# Patient Record
Sex: FEMALE | Race: ASIAN | NOT HISPANIC OR LATINO | URBAN - METROPOLITAN AREA
[De-identification: names, ages, dates, MRNs, and addresses within clinical notes are randomized per-mention and may not be internally consistent; named-entity substitution may affect disease eponyms.]

---

## 2022-11-15 ENCOUNTER — EMERGENCY (EMERGENCY)
Facility: HOSPITAL | Age: 57
LOS: 0 days | Discharge: HOME | End: 2022-11-15
Attending: EMERGENCY MEDICINE | Admitting: EMERGENCY MEDICINE

## 2022-11-15 VITALS
OXYGEN SATURATION: 99 % | TEMPERATURE: 96 F | SYSTOLIC BLOOD PRESSURE: 145 MMHG | HEART RATE: 78 BPM | DIASTOLIC BLOOD PRESSURE: 82 MMHG | HEIGHT: 64 IN | WEIGHT: 117.95 LBS | RESPIRATION RATE: 20 BRPM
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
## 2022-11-15 DIAGNOSIS — Z23 ENCOUNTER FOR IMMUNIZATION: ICD-10-CM

## 2022-11-15 DIAGNOSIS — S61.210A LACERATION WITHOUT FOREIGN BODY OF RIGHT INDEX FINGER WITHOUT DAMAGE TO NAIL, INITIAL ENCOUNTER: ICD-10-CM

## 2022-11-15 PROCEDURE — 99283 EMERGENCY DEPT VISIT LOW MDM: CPT | Mod: 25

## 2022-11-15 PROCEDURE — 12001 RPR S/N/AX/GEN/TRNK 2.5CM/<: CPT

## 2022-11-15 RX ORDER — TETANUS TOXOID, REDUCED DIPHTHERIA TOXOID AND ACELLULAR PERTUSSIS VACCINE, ADSORBED 5; 2.5; 8; 8; 2.5 [IU]/.5ML; [IU]/.5ML; UG/.5ML; UG/.5ML; UG/.5ML
0.5 SUSPENSION INTRAMUSCULAR ONCE
Refills: 0 | Status: COMPLETED | OUTPATIENT
Start: 2022-11-15 | End: 2022-11-15

## 2022-11-15 RX ADMIN — TETANUS TOXOID, REDUCED DIPHTHERIA TOXOID AND ACELLULAR PERTUSSIS VACCINE, ADSORBED 0.5 MILLILITER(S): 5; 2.5; 8; 8; 2.5 SUSPENSION INTRAMUSCULAR at 12:30

## 2022-11-15 NOTE — ED PROVIDER NOTE - PHYSICAL EXAMINATION
Vital Signs: I have reviewed the initial vital signs.  Constitutional: well-nourished, no acute distress  Musculoskeletal: good ROM of extremity,  no bony tenderness, no deformity, good peripheral pulses  Integumentary: (+)1 cm superficial skin avulsion prox extensor surface right 2nd digit, with bleeding. no tendon injury. good cap refill  Neurologic: awake, alert, extremities’ motor and sensory functions grossly intact, no focal deficits  heme: (-) no adenopathy (-)lymphangitis

## 2022-11-15 NOTE — ED PROVIDER NOTE - NS ED ATTENDING STATEMENT MOD
This was a shared visit with the ÁLVARO. I reviewed and verified the documentation and independently performed the documented:

## 2022-11-15 NOTE — ED PROVIDER NOTE - ATTENDING APP SHARED VISIT CONTRIBUTION OF CARE
56-year-old old female presents with family member for evaluation of laceration to right pointer finger sustained this morning evaluation of broken glass in her second.  Patient needs tetanus.  On exam patient in NAD, AAOx3, positive avulsion of skin to right pointer finger, positive bleeding, good range of motion, brisk cap refill

## 2022-11-15 NOTE — ED PROVIDER NOTE - OBJECTIVE STATEMENT
55 y/o presents to the Ed with skin avulsion to right 2nd digit extensor surface on broken glass in the sink this am. patient with multiple abrasions to forearm. patient without any tingling to digits. patient with bleeding from wound.

## 2022-11-15 NOTE — ED ADULT NURSE NOTE - HISTORY OF COVID-19 VACCINATION
Pt resides in pvt home with .  PTA pt was functionally IND and used a straight cane for stair negotiation if no railing. Vaccine status unknown

## 2022-11-15 NOTE — ED PROVIDER NOTE - CLINICAL SUMMARY MEDICAL DECISION MAKING FREE TEXT BOX
Tetanus updated.  Wound dressed with quick clot.  Hemostasis achieved.  Patient instructed on wound care.  Patient will return if any worsening symptoms or concerns

## 2022-11-15 NOTE — ED PROVIDER NOTE - NSFOLLOWUPINSTRUCTIONS_ED_ALL_ED_FT
Laceration    A laceration is a cut that goes through all of the layers of the skin and into the tissue that is right under the skin. Some lacerations heal on their own. Others need to be closed with stitches (sutures), staples, skin adhesive strips, or skin glue. Proper laceration care minimizes the risk of infection and helps the laceration to heal better.     SEEK IMMEDIATE MEDICAL CARE IF YOU HAVE THE FOLLOWING SYMPTOMS: swelling around the wound, worsening pain, drainage from the wound, red streaking going away from your wound, inability to move finger or toe near the laceration, or discoloration of skin near the laceration.    Skin Avulsion    WHAT YOU NEED TO KNOW:    Skin avulsion is a wound that happens when skin is torn from your body during an accident or other injury. The torn skin may be lost or too damaged to be repaired, and it must be removed. A wound of this type cannot be stitched closed because there is tissue missing. Avulsion wounds are usually bigger and have more scars because of the missing tissue.    DISCHARGE INSTRUCTIONS:    Medicines:     Antibiotic ointment: Your healthcare provider may tell you to gently rub a topical antibiotic ointment on your wound. This will help prevent an infection and help your wound heal faster.       Pain medicine: You may be given medicine to take away or decrease pain. Do not wait until the pain is severe before you take your medicine.      NSAIDs, such as ibuprofen, help decrease swelling, pain, and fever. This medicine is available with or without a doctor's order. NSAIDs can cause stomach bleeding or kidney problems in certain people. If you take blood thinner medicine, always ask if NSAIDs are safe for you. Always read the medicine label and follow directions. Do not give these medicines to children under 6 months of age without direction from your child's healthcare provider.      Take your medicine as directed. Contact your healthcare provider if you think your medicine is not helping or if you have side effects. Tell him of her if you are allergic to any medicine. Keep a list of the medicines, vitamins, and herbs you take. Include the amounts, and when and why you take them. Bring the list or the pill bottles to follow-up visits. Carry your medicine list with you in case of an emergency.    Care for your wound: Avulsion wounds may take longer to heal because they cannot be closed with tape or stitches. Keep your wound clean and protected to prevent infection and speed healing.     Clean your wound: Wash your hands with soap and water before and after you care for your wound. You may be able to use a soft cloth to gently clean the wound after the first 24 to 48 hours. After that, gently clean the wound once or twice a day with cool water. Do not soak your wound. Use soap to clean around the wound, but try not to get any on the wound itself. Do not use alcohol or hydrogen peroxide to clean your wound unless you are directed to. Gently pat the area dry and reapply the bandage as directed.       Elevate your wound: Prop your injured area on pillows to raise it above the level of your heart. This will help reduce pain and swelling. Do this for 30 minutes at a time, as often as you can.       Bandage your wound: Bandages keep your wound clean, dry, and protected from infection. They may also prevent swelling. Use a bandage that does not stick to your wound, and has a spongy layer to absorb fluids. Leave your bandage on as long as directed. Ask your healthcare provider when and how to change your bandage. Do not wrap the bandage too tightly. This could cut off blood flow and cause more injury.       Use cool compresses: Wet a washcloth or towel with cool water and hold it on your wound as directed. Ask how often to apply the compress and for how long each time.      Reduce scarring: Avoid direct sunlight on your wound. Sunlight may burn or change the color of the new skin over your wound. Use sunscreen (SPF 30 or higher) on the new skin for at least 1 year after it heals.    Support for leg and arm wounds: You may need to use crutches if the wound is on your leg. You may need to use a sling if the wound is on your arm. Crutches and slings help protect the injured area, prevent further injury, and heal the area in the right position.     Follow up with your healthcare provider within 2 days or as directed: If you have stitches, ask when to return to have them removed. Write down your questions so you remember to ask them during your visits.     Contact your healthcare provider if:     You have new pain, or it gets worse.       You have trouble moving the injured body area.       Your wound splits open or does not seem to be healing.    Return to the emergency department if:     You have a fever.       You have painful swelling, redness, or warmth around your wound.      Your wound is red and there are red streaks on your skin starting at your wound and moving upward.       Your wound is draining pus.       You have heavy bleeding or bleeding that does not stop after 10 minutes of holding firm, direct pressure over the wound.      You feel like there is an object stuck in your wound.          © Copyright [employer] 2019 All illustrations and images included in CareNotes are the copyrighted property of [employer] or [employer].

## 2022-11-15 NOTE — ED PROVIDER NOTE - PATIENT PORTAL LINK FT
You can access the FollowMyHealth Patient Portal offered by [facility] by registering at the following website: [email or web address]. By joining [employer]’s FollowMyHealth portal, you will also be able to view your health information using other applications (apps) compatible with our system.

## 2022-11-18 ENCOUNTER — EMERGENCY (EMERGENCY)
Facility: HOSPITAL | Age: 57
LOS: 0 days | Discharge: HOME | End: 2022-11-18
Attending: EMERGENCY MEDICINE | Admitting: EMERGENCY MEDICINE

## 2022-11-18 VITALS
HEART RATE: 84 BPM | DIASTOLIC BLOOD PRESSURE: 78 MMHG | WEIGHT: 117.95 LBS | SYSTOLIC BLOOD PRESSURE: 138 MMHG | TEMPERATURE: 97 F | HEIGHT: 64 IN | RESPIRATION RATE: 20 BRPM
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
## 2022-11-18 DIAGNOSIS — S61.210D LACERATION WITHOUT FOREIGN BODY OF RIGHT INDEX FINGER WITHOUT DAMAGE TO NAIL, SUBSEQUENT ENCOUNTER: ICD-10-CM

## 2022-11-18 PROBLEM — Z78.9 OTHER SPECIFIED HEALTH STATUS: Chronic | Status: ACTIVE | Noted: 2022-11-15

## 2022-11-18 PROCEDURE — 99283 EMERGENCY DEPT VISIT LOW MDM: CPT

## 2022-11-18 NOTE — ED PROVIDER NOTE - CARE PROVIDER_API CALL
Guicho Carlton (DO)  Plastic Surgery  [address] [place] [address]  Phone: [phone]  Fax: [phone]  Follow Up Time:

## 2022-11-18 NOTE — ED PROVIDER NOTE - OBJECTIVE STATEMENT
Patient is a 56-year-old female no medical history here for evaluation of skin avulsion to base of right second digit 4 days ago.  Patient was evaluated in the ED at that time and had wound dressed.  Patient saw her primary doctor today who requested patient come to ED for wound check.  Patient denies fever, chills, redness

## 2022-11-18 NOTE — ED PROVIDER NOTE - ATTENDING APP SHARED VISIT CONTRIBUTION OF CARE
51-year-old female seen in the emergency department on November 15 for right second digit skin status post cutting it on broken glass in the same 56-year-old female seen in the emergency department on November 15 for right second digit avulsed skin status post cutting it on broken glass in sink presents today for wound check.  Patient reports she noted some discharge and mild erythema surrounding the involved skin, went to see her primary care doctor presented to the emergency department for further evaluation.  Patient reports no discomfort or pain to the area.  Has been applying Neosporin.. No fever, chills, nausea, vomiting, numbness/tingling, decreased sensation, ecchymoses, swelling, hearing a click/feeling a pop, or any trauma. received tdap on last visit,    on exam: WDWN non toxic appearing pt. (+) R second digit on extensor surface avulsed skin with healing skin overlying, no active draining, mild surrounding erythema, no streaking.  No swelling or ecchymoses. No crepitus. Radial pulses 2/4 b/l. Cap refill < 2 seconds, No obvious bony deformity. Good finger opposition, FROM of R wrist in flexion, extension, ulna and radial deviation. No snuff box tenderness.    FROM of R second index finger in flexion, extension at PIP, DIP and MCP, no pain to palpation to finger pad, no pain to palpation along tendon sheath, finger not held in flexion, no fusiform swelling, no pain with passive extension , no weakness,  FROM of R elbow in flexion, extensions, supination and pronation, and LRshoulder in flexion, extension, abduction, and adduction with no pain to palpation. Motor 5/5 and sensation intact over the upper extremity including R second digit.     Plan: Extensive conversation had with patient regarding proper wound care, antibiotics, signs and symptoms to return for, follow-up with PMD and plastics

## 2022-11-18 NOTE — ED PROVIDER NOTE - PHYSICAL EXAMINATION
VITAL SIGNS: I have reviewed nursing notes and confirm.  CONSTITUTIONAL: Well-developed; well-nourished; in no acute distress.   SKIN:  see ext :Remainder of skin exam is warm and dry, no acute rash.    HEAD: Normocephalic; atraumatic.  EYES:  conjunctiva and sclera clear.  ENT: No nasal discharge; airway clear.  EXT: Avulsion to base of right second digit overlying dorsal aspect no surrounding erythema warmth or streaking proximally.  Full range of motion of the affected digit Normal ROM.  No clubbing, cyanosis or edema.   NEURO: Alert, oriented, grossly unremarkable

## 2022-11-18 NOTE — ED PROVIDER NOTE - NS ED ROS FT
MS:  No myalgia, muscle weakness, joint pain or back pain.  Neuro:  No headache or weakness.  No LOC.  Skin:  + skin avulsion   Endocrine: No history of thyroid disease or diabetes.  Except as documented in the HPI,  all other systems are negative.

## 2022-11-18 NOTE — ED PROVIDER NOTE - PATIENT PORTAL LINK FT
You can access the FollowMyHealth Patient Portal offered by [facility] by registering at the following website: [email or web address]. By joining [employer]’s FollowMyHealth portal, you will also be able to view your health information using other applications (apps) compatible with our system.

## 2022-11-18 NOTE — ED PROVIDER NOTE - NSFOLLOWUPINSTRUCTIONS_ED_ALL_ED_FT
Laceration    A laceration is a cut that goes through all of the layers of the skin and into the tissue that is right under the skin. Some lacerations heal on their own. Others need to be closed with skin adhesive strips, skin glue, stitches (sutures), or staples. Proper laceration care minimizes the risk of infection and helps the laceration to heal better.  If non-absorbable stitches or staples have been placed, they must be taken out within the time frame instructed by your healthcare provider.    SEEK IMMEDIATE MEDICAL CARE IF YOU HAVE ANY OF THE FOLLOWING SYMPTOMS: swelling around the wound, worsening pain, drainage from the wound, red streaking going away from your wound, inability to move finger or toe near the laceration, or discoloration of skin near the laceration.   Skin Tear Care    A skin tear is a wound in which the top layer of skin has peeled off. This is a common problem with aging because the skin becomes thinner and more fragile as a person gets older. In addition, some medicines, such as oral corticosteroids, can lead to skin thinning if taken for long periods of time.     A skin tear is often repaired with tape or skin adhesive strips. This keeps the skin that has been peeled off in contact with the healthier skin beneath. Depending on the location of the wound, a bandage (dressing) may be applied over the tape or skin adhesive strips. Sometimes, during the healing process, the skin turns black and dies. Even when this happens, the torn skin acts as a good dressing until the skin underneath gets healthier and repairs itself.    HOME CARE INSTRUCTIONS  Change dressings once per day or as directed by your caregiver.   Gently clean the skin tear and the area around the tear using saline solution or mild soap and water.   Do not rub the injured skin dry. Let the area air dry.   Apply petroleum jelly or an antibiotic cream or ointment to keep the tear moist. This will help the wound heal. Do not allow a scab to form.   If the dressing sticks before the next dressing change, moisten it with warm soapy water and gently remove it.   Protect the injured skin until it has healed.   Only take over-the-counter or prescription medicines as directed by your caregiver.   Take showers or baths using warm soapy water. Apply a new dressing after the shower or bath.  Keep all follow-up appointments as directed by your caregiver.      SEEK IMMEDIATE MEDICAL CARE IF:  You have redness, swelling, or increasing pain in the skin tear.   You have pus coming from the skin tear.   You have chills.  You have a red streak that goes away from the skin tear.  You have a bad smell coming from the tear or dressing.   You have a fever or persistent symptoms for more than 2–3 days.   You have a fever and your symptoms suddenly get worse.     MAKE SURE YOU:  Understand these instructions.   Will watch this condition.  Will get help right away if your child is not doing well or gets worse.    ADDITIONAL NOTES AND INSTRUCTIONS    Please follow up with your Primary MD in 24-48 hr.  Seek immediate medical care for any new/worsening signs or symptoms.

## 2022-11-18 NOTE — ED PROVIDER NOTE - CLINICAL SUMMARY MEDICAL DECISION MAKING FREE TEXT BOX
56-year-old female seen in the emergency department on November 15 for right second digit avulsed skin status post cutting it on broken glass in sink presents today for wound check.  Patient n/v intact with Full ROM and full motor strength with no signs of any serious injury. No signs of tendon injury on direct examination. Tetanus up-to-date.  Wound care discussed in detail. Signs of infection discussed. Medications administered and prescribed/OTC home meds discussed.  All questions and concerns from patient or family addressed. Understanding of instructions verbalized.  Will follow up as discussed.

## 2022-11-18 NOTE — ED PROVIDER NOTE - PROGRESS NOTE DETAILS
ED Attending NANO Norman  Extensive conversation had with patient regarding proper wound care, antibiotics, signs and symptoms to return for, follow-up with PMD and plastics

## 2022-12-09 NOTE — ED ADULT NURSE NOTE - NS_ED_NURSE_TEACHING_TOPIC_ED_A_ED
[id number]     Margo Forte MRN: [id number]  AGE: 69 year old  ADMIT DATE: 12/6/2022    CODE STATUS: Full Resuscitation  ISOLATION STATUS: No active isolations   DIET: Consistent Carb Moderate (45-75 Gm/meal) Diet  3 Times/day W Meals; Ensure High Protein/high Protein Low Carbohydrate Supplement, Vanilla Oral Nutrition Supplement  2 Times/day W Lunch & Dinner; Sherwin/tissue Building, Orange + Water Oral Nutrition Supplement    ALLERGIES:  Patient has no known allergies.     DX:Failed flap  (primary encounter diagnosis)  PAD (peripheral artery disease) (CMS/[facility])  Type 2 diabetes mellitus with hyperglycemia, without long-term current use of insulin (CMS/[facility])  Foot pain, right  Ischemic cardiomyopathy  Chronic systolic heart failure (CMS/[facility])  Necrosis of toe (CMS/[facility])     Att: Max Hurst MD  PCP: Yoni Hall MD  IP Consult Orders (From admission, onward)             Start     Ordered    12/08/22 1405  Inpatient consult to Pain Management  ONE TIME        Comments: Dr Hurst would like you help in managing this patients pain especially after he is discharged.   Provider:  Morales Ibrahim MD    12/08/22 1404    12/08/22 1245  Inpatient Consult to Interventional Radiology  ONE TIME        Provider:  Katy Connolly CNP    12/08/22 1245    12/07/22 1250  Inpatient consult to Wound Care Medical Provider  (IP Consult to Wound Care Medical Provider Panel)  ONE TIME        Provider:  Julio Mcgovern MD    12/07/22 1251    12/06/22 1913  Inpatient consult to Podiatry  ONE TIME        Provider:  Lenny Brennan DPM    12/06/22 1912 12/06/22 1912  Inpatient consult to Cardiology  ONE TIME        Provider:  Eliezer Villegas MD    12/06/22 1912                    BP: 137/66  Temp: 98.1 °F (36.7 °C)  Temp src: Oral  Heart Rate: (!) 59  Resp: 18  SpO2: 98 %  Height: 5' 1.81\" (157 cm)  Weight: 57.8 kg (127 lb 6.8 oz)   Weight change:      Recent Labs   Lab 12/08/22  0749 12/08/22  1115 12/08/22  1729  12/08/22  2144   GLUCOSE BEDSIDE 184* 209* 101* 126*        Creatinine (mg/dL)   Date Value   12/06/2022 0.98   11/24/2022 0.71     PTT (sec)   Date Value   12/06/2022 25     INR (no units)   Date Value   12/06/2022 1.0     WBC (K/mcL)   Date Value   12/06/2022 6.9   11/26/2022 4.4        I/O last 3 completed shifts:  In: -   Out: 580 [Urine:580]                         IMPORTANT EVENTS THIS SHIFT:    Pain managed with percocet q 4 hrs prn. Nitroglycerine patch removed, foot dressing changed with iodine gauze   Pt comfortable, all needs met. Call light within reach. Med script for percocet by Dr. Ibrahim in pts chart. RN to call pharmacy (pts choice) to confirm meds are in stock and available for immediate pu prior to discharging patient. Send info to  for pts request to have note sent or available for pts daughter who lives in [place] permission to travel here to care for pt.    IMPORTANT EVENTS COMING UP/GOALS (PLEASE INCLUDE WHITE BOARD AND DISCHARGE BOARD UPDATES):     consult--Pt needs a care taker, how can daughter in [place] come and help    Call pharmacy to make sure percocet in stock before pt is going to get prescription filled   PATIENT SPECIAL NEEDS/ACCOMMODATIONS:    Tele  ACHS  Surgical boot  Andorran speaking                              Wound care

## 2023-06-29 ENCOUNTER — EMERGENCY (EMERGENCY)
Facility: HOSPITAL | Age: 58
LOS: 0 days | Discharge: HOME | End: 2023-06-29
Attending: EMERGENCY MEDICINE
Payer: MEDICAID

## 2023-06-29 VITALS
HEIGHT: 64 IN | DIASTOLIC BLOOD PRESSURE: 59 MMHG | RESPIRATION RATE: 18 BRPM | HEART RATE: 68 BPM | SYSTOLIC BLOOD PRESSURE: 123 MMHG | OXYGEN SATURATION: 99 % | WEIGHT: 110.01 LBS

## 2023-06-29 VITALS
SYSTOLIC BLOOD PRESSURE: 119 MMHG | RESPIRATION RATE: 18 BRPM | HEART RATE: 65 BPM | DIASTOLIC BLOOD PRESSURE: 64 MMHG | TEMPERATURE: 97 F | OXYGEN SATURATION: 99 %

## 2023-06-29 DIAGNOSIS — R00.1 BRADYCARDIA, UNSPECIFIED: ICD-10-CM

## 2023-06-29 DIAGNOSIS — R10.9 UNSPECIFIED ABDOMINAL PAIN: ICD-10-CM

## 2023-06-29 DIAGNOSIS — K52.9 NONINFECTIVE GASTROENTERITIS AND COLITIS, UNSPECIFIED: ICD-10-CM

## 2023-06-29 DIAGNOSIS — R11.0 NAUSEA: ICD-10-CM

## 2023-06-29 LAB
ALBUMIN SERPL ELPH-MCNC: 3.9 G/DL — SIGNIFICANT CHANGE UP (ref 3.5–5.2)
ALP SERPL-CCNC: 96 U/L — SIGNIFICANT CHANGE UP (ref 30–115)
ALT FLD-CCNC: 30 U/L — SIGNIFICANT CHANGE UP (ref 0–41)
ANION GAP SERPL CALC-SCNC: 15 MMOL/L — HIGH (ref 7–14)
APPEARANCE UR: CLEAR — SIGNIFICANT CHANGE UP
AST SERPL-CCNC: 32 U/L — SIGNIFICANT CHANGE UP (ref 0–41)
BASOPHILS # BLD AUTO: 0.03 K/UL — SIGNIFICANT CHANGE UP (ref 0–0.2)
BASOPHILS NFR BLD AUTO: 0.3 % — SIGNIFICANT CHANGE UP (ref 0–1)
BILIRUB DIRECT SERPL-MCNC: <0.2 MG/DL — SIGNIFICANT CHANGE UP (ref 0–0.3)
BILIRUB INDIRECT FLD-MCNC: < 0.2 MG/DL — SIGNIFICANT CHANGE UP (ref 0.2–1.2)
BILIRUB SERPL-MCNC: <0.2 MG/DL — SIGNIFICANT CHANGE UP (ref 0.2–1.2)
BILIRUB UR-MCNC: NEGATIVE — SIGNIFICANT CHANGE UP
BUN SERPL-MCNC: 25 MG/DL — HIGH (ref 10–20)
CALCIUM SERPL-MCNC: 8.6 MG/DL — SIGNIFICANT CHANGE UP (ref 8.4–10.5)
CHLORIDE SERPL-SCNC: 106 MMOL/L — SIGNIFICANT CHANGE UP (ref 98–110)
CO2 SERPL-SCNC: 19 MMOL/L — SIGNIFICANT CHANGE UP (ref 17–32)
COLOR SPEC: YELLOW — SIGNIFICANT CHANGE UP
CREAT SERPL-MCNC: 0.8 MG/DL — SIGNIFICANT CHANGE UP (ref 0.7–1.5)
DIFF PNL FLD: NEGATIVE — SIGNIFICANT CHANGE UP
EGFR: 86 ML/MIN/1.73M2 — SIGNIFICANT CHANGE UP
EOSINOPHIL # BLD AUTO: 0.08 K/UL — SIGNIFICANT CHANGE UP (ref 0–0.7)
EOSINOPHIL NFR BLD AUTO: 0.9 % — SIGNIFICANT CHANGE UP (ref 0–8)
GLUCOSE SERPL-MCNC: 156 MG/DL — HIGH (ref 70–99)
GLUCOSE UR QL: NEGATIVE MG/DL — SIGNIFICANT CHANGE UP
HCT VFR BLD CALC: 34 % — LOW (ref 37–47)
HGB BLD-MCNC: 11.1 G/DL — LOW (ref 12–16)
IMM GRANULOCYTES NFR BLD AUTO: 0.2 % — SIGNIFICANT CHANGE UP (ref 0.1–0.3)
KETONES UR-MCNC: NEGATIVE — SIGNIFICANT CHANGE UP
LACTATE SERPL-SCNC: 2.7 MMOL/L — HIGH (ref 0.7–2)
LEUKOCYTE ESTERASE UR-ACNC: NEGATIVE — SIGNIFICANT CHANGE UP
LIDOCAIN IGE QN: 44 U/L — SIGNIFICANT CHANGE UP (ref 7–60)
LYMPHOCYTES # BLD AUTO: 4.49 K/UL — HIGH (ref 1.2–3.4)
LYMPHOCYTES # BLD AUTO: 52.2 % — HIGH (ref 20.5–51.1)
MCHC RBC-ENTMCNC: 29 PG — SIGNIFICANT CHANGE UP (ref 27–31)
MCHC RBC-ENTMCNC: 32.6 G/DL — SIGNIFICANT CHANGE UP (ref 32–37)
MCV RBC AUTO: 88.8 FL — SIGNIFICANT CHANGE UP (ref 81–99)
MONOCYTES # BLD AUTO: 0.48 K/UL — SIGNIFICANT CHANGE UP (ref 0.1–0.6)
MONOCYTES NFR BLD AUTO: 5.6 % — SIGNIFICANT CHANGE UP (ref 1.7–9.3)
NEUTROPHILS # BLD AUTO: 3.5 K/UL — SIGNIFICANT CHANGE UP (ref 1.4–6.5)
NEUTROPHILS NFR BLD AUTO: 40.8 % — LOW (ref 42.2–75.2)
NITRITE UR-MCNC: NEGATIVE — SIGNIFICANT CHANGE UP
NRBC # BLD: 0 /100 WBCS — SIGNIFICANT CHANGE UP (ref 0–0)
PH UR: 7 — SIGNIFICANT CHANGE UP (ref 5–8)
PLATELET # BLD AUTO: 254 K/UL — SIGNIFICANT CHANGE UP (ref 130–400)
PMV BLD: 8.5 FL — SIGNIFICANT CHANGE UP (ref 7.4–10.4)
POTASSIUM SERPL-MCNC: 3.9 MMOL/L — SIGNIFICANT CHANGE UP (ref 3.5–5)
POTASSIUM SERPL-SCNC: 3.9 MMOL/L — SIGNIFICANT CHANGE UP (ref 3.5–5)
PROT SERPL-MCNC: 5.8 G/DL — LOW (ref 6–8)
PROT UR-MCNC: NEGATIVE MG/DL — SIGNIFICANT CHANGE UP
RBC # BLD: 3.83 M/UL — LOW (ref 4.2–5.4)
RBC # FLD: 13.1 % — SIGNIFICANT CHANGE UP (ref 11.5–14.5)
SODIUM SERPL-SCNC: 140 MMOL/L — SIGNIFICANT CHANGE UP (ref 135–146)
SP GR SPEC: 1.01 — SIGNIFICANT CHANGE UP (ref 1.01–1.03)
TROPONIN T SERPL-MCNC: <0.01 NG/ML — SIGNIFICANT CHANGE UP
UROBILINOGEN FLD QL: 0.2 MG/DL — SIGNIFICANT CHANGE UP
WBC # BLD: 8.6 K/UL — SIGNIFICANT CHANGE UP (ref 4.8–10.8)
WBC # FLD AUTO: 8.6 K/UL — SIGNIFICANT CHANGE UP (ref 4.8–10.8)

## 2023-06-29 PROCEDURE — 36415 COLL VENOUS BLD VENIPUNCTURE: CPT

## 2023-06-29 PROCEDURE — 84484 ASSAY OF TROPONIN QUANT: CPT

## 2023-06-29 PROCEDURE — 99285 EMERGENCY DEPT VISIT HI MDM: CPT

## 2023-06-29 PROCEDURE — 99285 EMERGENCY DEPT VISIT HI MDM: CPT | Mod: 25

## 2023-06-29 PROCEDURE — 96374 THER/PROPH/DIAG INJ IV PUSH: CPT

## 2023-06-29 PROCEDURE — 80048 BASIC METABOLIC PNL TOTAL CA: CPT

## 2023-06-29 PROCEDURE — 80076 HEPATIC FUNCTION PANEL: CPT

## 2023-06-29 PROCEDURE — 82962 GLUCOSE BLOOD TEST: CPT

## 2023-06-29 PROCEDURE — 81003 URINALYSIS AUTO W/O SCOPE: CPT

## 2023-06-29 PROCEDURE — 74177 CT ABD & PELVIS W/CONTRAST: CPT | Mod: 26,MA

## 2023-06-29 PROCEDURE — 74177 CT ABD & PELVIS W/CONTRAST: CPT | Mod: MA

## 2023-06-29 PROCEDURE — 85025 COMPLETE CBC W/AUTO DIFF WBC: CPT

## 2023-06-29 PROCEDURE — 83605 ASSAY OF LACTIC ACID: CPT

## 2023-06-29 PROCEDURE — 83690 ASSAY OF LIPASE: CPT

## 2023-06-29 PROCEDURE — 96375 TX/PRO/DX INJ NEW DRUG ADDON: CPT

## 2023-06-29 PROCEDURE — 93010 ELECTROCARDIOGRAM REPORT: CPT | Mod: 76

## 2023-06-29 PROCEDURE — 93005 ELECTROCARDIOGRAM TRACING: CPT

## 2023-06-29 RX ORDER — FAMOTIDINE 10 MG/ML
20 INJECTION INTRAVENOUS ONCE
Refills: 0 | Status: COMPLETED | OUTPATIENT
Start: 2023-06-29 | End: 2023-06-29

## 2023-06-29 RX ORDER — SODIUM CHLORIDE 9 MG/ML
1000 INJECTION INTRAMUSCULAR; INTRAVENOUS; SUBCUTANEOUS ONCE
Refills: 0 | Status: COMPLETED | OUTPATIENT
Start: 2023-06-29 | End: 2023-06-29

## 2023-06-29 RX ORDER — ONDANSETRON 8 MG/1
4 TABLET, FILM COATED ORAL ONCE
Refills: 0 | Status: COMPLETED | OUTPATIENT
Start: 2023-06-29 | End: 2023-06-29

## 2023-06-29 RX ADMIN — ONDANSETRON 4 MILLIGRAM(S): 8 TABLET, FILM COATED ORAL at 03:17

## 2023-06-29 RX ADMIN — FAMOTIDINE 20 MILLIGRAM(S): 10 INJECTION INTRAVENOUS at 03:16

## 2023-06-29 RX ADMIN — SODIUM CHLORIDE 1000 MILLILITER(S): 9 INJECTION INTRAMUSCULAR; INTRAVENOUS; SUBCUTANEOUS at 03:16

## 2023-06-29 NOTE — ED PROVIDER NOTE - CARE PROVIDER_API CALL
Otis Blackwood E  Pulmonary Disease  [address] [place] [id number]  Phone: [phone]  Fax: [phone]  Follow Up Time:

## 2023-06-29 NOTE — ED ADULT TRIAGE NOTE - CHIEF COMPLAINT QUOTE
Pt BIBA from home for N/V and 1 episode syncope; after eating crabs at 1800; BP on scene 60/30; received 1L fluids; /60; NKA; no HX.

## 2023-06-29 NOTE — ED PROVIDER NOTE - OBJECTIVE STATEMENT
57 year old female no sig past medical history comes to emergency room for abdominal pain and nausea. patient states that she had crabs for dinner and woke up with 2am very nausea and having stomach cramps. patient went to see family member for help and passed out for a second. no injury as per family member. Family called 911 and patient was hypotensive on scene 86/50 and had IV placed and given IVF with improvement of BP. patient in emergency room still nausea with stomach cramps and feels like she is going to have BM.

## 2023-06-29 NOTE — ED PROVIDER NOTE - PHYSICAL EXAMINATION
Physical Exam    Vital Signs: I have reviewed the initial vital signs.  Constitutional: well-nourished, appears stated age, no acute distress  Eyes: Conjunctiva pink, Sclera clear, PERRLA, EOMI.  Cardiovascular: S1 and S2, regular rate, regular rhythm, well-perfused extremities, radial pulses equal and 2+  Respiratory: unlabored respiratory effort, clear to auscultation bilaterally no wheezing, rales and rhonchi  Gastrointestinal: soft, mild generalized tenderness, no pulsatile mass, normal bowl sounds  Musculoskeletal: supple neck, no lower extremity edema, no midline tenderness  Integumentary: warm, dry, no rash  Neurologic: awake, alert, cranial nerves II-XII grossly intact, extremities’ motor and sensory functions grossly intact  Psychiatric: appropriate mood, appropriate affect

## 2023-06-29 NOTE — ED PROVIDER NOTE - NSFOLLOWUPINSTRUCTIONS_ED_ALL_ED_FT
Follow up with your primary care doctor in 1-2 days    Follow up with Lung doctor for CT findings today as you will need another CT scan. See attached report.    Our Emergency Department Referral Coordinators will be reaching out to you in the next 24-48 hours from 9:00am to 5:00pm with a follow up appointment. Please expect a phone call from the hospital in that time frame. If you do not receive a call or if you have any questions or concerns, you can reach them at [phone]    Enteritis    WHAT YOU NEED TO KNOW:    Enteritis is inflammation of the small intestine. It may be caused by eating foods or drinking liquids contaminated with a virus, bacteria, or parasites. It may also be caused by certain medicines, damage from radiation, and medical conditions such as Crohn disease.     DISCHARGE INSTRUCTIONS:    Return to the emergency department if:     You cannot stop vomiting.      You have not urinated for 12 hours.     Contact your healthcare provider if:     You have a fever over 101.5.       You have blood or mucus in your bowel movements.       You continue to vomit or have diarrhea for more than 3 days, even after treatment.      You have a dry mouth and eyes, you are urinating less than usual, and you feel dizzy when you stand up.       Your mouth or eyes are dry. You are not urinating as much or as often.      You are losing weight without trying.      You have questions or concerns about your condition or care.    Medicines:     Medicines may be given to fight an infection caused by bacteria or a parasite. You may also need medicines to slow or stop your diarrhea or vomiting. Do not take these medicines unless your healthcare provider say it is okay. Other medicines may be needed to treat medical conditions that are causing enteritis.       Take your medicine as directed. Contact your healthcare provider if you think your medicine is not helping or if you have side effects. Tell him of her if you are allergic to any medicine. Keep a list of the medicines, vitamins, and herbs you take. Include the amounts, and when and why you take them. Bring the list or the pill bottles to follow-up visits. Carry your medicine list with you in case of an emergency.    Manage enteritis:     Eat foods that help to decrease symptoms. Limit or avoid foods and liquids that are high in sugar, fat, and fiber to help relieve diarrhea. It may be helpful to avoid lactose. Lactose is a type of sugar that is found in milk products. You may be able to tolerate soups, broths, well-cooked vegetables, canned fruit, and baked or broiled meats. Ask your dietitian or healthcare provider if you should follow a special diet. You may need to avoid other foods if you have certain medical conditions such as celiac disease.       Drink liquids as directed. Ask how much liquid to drink each day and which liquids are best for you. It is important to prevent or treat dehydration. Even if you have been vomiting, suck on ice chips or take small sips of clear liquids often. Slowly increase the amount of clear liquids you drink. If you become dehydrated, you may need IV liquids.      Drink an oral rehydration solution (ORS) as directed. An ORS contains water, salts, and sugar that are needed to replace lost body fluids. Ask what kind of ORS to use, how much to drink, and where to get it.    Prevent enteritis: Enteritis that is caused by bacteria, parasites, or viruses can be prevented. The following may help to prevent this type of enteritis:    Wash your hands often. Use soap and water. Wash your hands after you use the bathroom, change a child's diapers, or sneeze. Wash your hands before you prepare or eat food. Handwashing           Clean surfaces and do laundry often. Wash your clothes and towels separately from the rest of the laundry. Clean surfaces in your home with antibacterial  or bleach.      Clean food thoroughly and cook safely. Wash raw vegetables before you cook. Cook meat, fish, and eggs fully. Do not use the same dishes for raw meat as you do for other foods. Refrigerate any leftover food immediately.      Be aware when you camp or travel. Drink only clean water. Do not drink from rivers or lakes unless you purify or boil the water first. When you travel, drink bottled water and do not add ice. Do not eat fruit that has not been peeled. Do not eat raw fish or meat that is not fully cooked.     Follow up with your healthcare provider as directed: Write down your questions so you remember to ask them during your visits.        © Copyright [employer] 2019 All illustrations and images included in [employer]Notes are the copyrighted property of [employer] or [employer].

## 2023-06-29 NOTE — ED PROVIDER NOTE - CLINICAL SUMMARY MEDICAL DECISION MAKING FREE TEXT BOX
Healthy 56 yo F here for assessment of nausea without vomiting, cramping abdominal pain with sensation of needing to have a BM followed by episode of syncope.    Patient woke up from sleep with symptoms, went to the restroom, then began to feel lightheaded, woke up daughter and asked her to call 911. While waiting for EMS became increasingly lightheaded with nausea, diaphoresis and had episode of syncope -- no trauma, just slumped over.    Rapidly regained consciousness, was transiently hypotensive with EMS, IV was placed and BP rapidly improved prior to arrival in ED.    In ED continued to have nausea, then developed diarrhea but had no recurrence of dizziness, lightheadedness.    VS normal, on exam is well appearing, good color, no pallor, good turgor, no diaphoresis, clear lungs, RRR, soft, NT, ND abdomen.    Labs unremarkable. CT scan demonstrates enteritis and incidentally notes nodules in lung bilterally -- clinically no signs of PNA to suggest this is infectious. Patient provided with copy of results and advised on need for follow up scans.    Sx likely related to gastro-enteritis, syncope likely vagal. Trop is negative and EKG does not demonstrate any ischemic changes, patient has no risk factors for ischemic cardiac disease.    After fluids, GI meds patient feeling much better.    Will dc home with follow up, close monitoring, return precautions.

## 2023-06-29 NOTE — ED ADULT NURSE NOTE - IS THE PATIENT ABLE TO BE SCREENED?
Dr. Moreira @ [facility] in charge of her case.    Monthly Pain Clinic visits     Yes

## 2023-06-29 NOTE — ED ADULT NURSE NOTE - NSFALLUNIVINTERV_ED_ALL_ED
Bed/Stretcher in lowest position, wheels locked, appropriate side rails in place/Call bell, personal items and telephone in reach/Instruct patient to call for assistance before getting out of bed/chair/stretcher/Non-slip footwear applied when patient is off stretcher/[place] to call system/Physically safe environment - no spills, clutter or unnecessary equipment/Purposeful proactive rounding/Room/bathroom lighting operational, light cord in reach

## 2023-06-29 NOTE — ED PROVIDER NOTE - PATIENT PORTAL LINK FT
You can access the FollowMyHealth Patient Portal offered by [facility] by registering at the following website: [email or web address]. By joining [email or web address] FollowMyHealth portal, you will also be able to view your health information using other applications (apps) compatible with our system.

## 2023-07-11 ENCOUNTER — APPOINTMENT (OUTPATIENT)
Dept: PULMONOLOGY | Facility: CLINIC | Age: 58
End: 2023-07-11

## 2023-07-11 PROBLEM — Z00.00 ENCOUNTER FOR PREVENTIVE HEALTH EXAMINATION: Status: ACTIVE | Noted: 2023-07-11

## 2023-07-11 NOTE — ED ADULT NURSE NOTE - NS ED NURSE LEVEL OF CONSCIOUSNESS ORIENTATION
Attempted to call pt  No answer lvm to return call   Oriented - self; Oriented - place; Oriented - time

## 2024-04-08 NOTE — ED PROVIDER NOTE - PROGRESS NOTE
Instruction Sheet- Please Read    Your Postoperative Eye Kit will be given to you at the hospital.    Medication in your kit: NONE                            DROPS - NONE ( medications were placed in surgery so you do not needs drops after your surgery.  If you raise up your upper lid, you might notice a red area and a white/yellow plaque. That is normal - that is a steroid injection that will dissolve slowly over the next month)    2.  Eye shield - place over operated eye, with nothing under shield and secure with tape supplied in kit at nap time and bedtime for one week.    3.  DO NOT RUB YOUR EYE FOR ONE MONTH    4.  Sun Glasses as needed.    Bring eye kit to your post operative appointment at the office.       Dr. Gee- for an eye emergency call our office  24 hours a day and the correct emergency contact info will be given    ------------------------------------------------------------------------------------------------------------------    Care of Your Eye After Surgery    What to expect  When you go home from your cataract surgery, plan on only light activities when you get home.  You may want to take a nap.  You may use your eyes, walk around, ride in a car (NO driving). Some patients have scratching and mild pain - over the counter pain medications such as Tylenol are helpful.    Leave eye shield on as you ride home, once home you may take it off except for when you sleep. You may wear glasses although you may notice they may make the surgery eye more blurry, so you can also try not wearing then and see if that gives clearest vision..  The next day you will go to our office.  The eye will be red and the vision will usually be blurry.  It will take most patients several weeks to reach full best corrected vision.    Do Not's  DO NOT rub the eye for one month  DO NOT drive or operate hazardous equipment or machinery for 24 hours after your surgery.  Do not operate hazardous machinery  (i.e.drills, saws) until you feel your vision has recovered adequately for safe operation  DO NOT make legally binding decisions for 24 hours.  DO NOT drink alcohol, including beer for 24 hours.  DO NOT smoke any substance for 24 hours.    Can Do  The day after surgery, you can take a bath or shower, wash your hair, etc. But keep your eye closed.  If soap gets in the eye, it may sting a little but won't cause damage. Let the water run over your face to rinse out the soap. Just don't rub your eyes.   You may use your eyes for whatever visual activities you wish.  You may bend, lift, and stoop. Use common sense - if something hurts, don't do it.  You may drive when we tell you your vision is sufficient to drive - this varies for each patient.  You may resume most normal activities. If you have specific activities you enjoy but consider strenuous, ask us specifically about these.   Resume all medications taken prior to surgery unless specifically instructed to do otherwise by your doctor.    Notify your physician if you experience   Significant pain or significant decrease in vision  Significant bleeding    Common side effects  Tearing, initial blurry vision, mild red staining on patch and possible pink fluid staining on patch.  Mild or moderate pain, may occur.  For patients using Post-operative drops - may burn upon instillation for the first couple days.    Improved.